# Patient Record
Sex: FEMALE | Race: WHITE | URBAN - METROPOLITAN AREA
[De-identification: names, ages, dates, MRNs, and addresses within clinical notes are randomized per-mention and may not be internally consistent; named-entity substitution may affect disease eponyms.]

---

## 2017-04-07 ENCOUNTER — THERAPY VISIT (OUTPATIENT)
Dept: PHYSICAL THERAPY | Facility: CLINIC | Age: 82
End: 2017-04-07
Payer: COMMERCIAL

## 2017-04-07 DIAGNOSIS — G89.29 CHRONIC PAIN OF RIGHT KNEE: Primary | ICD-10-CM

## 2017-04-07 DIAGNOSIS — M25.561 CHRONIC PAIN OF RIGHT KNEE: Primary | ICD-10-CM

## 2017-04-07 PROCEDURE — 97161 PT EVAL LOW COMPLEX 20 MIN: CPT | Mod: GP | Performed by: PHYSICAL THERAPIST

## 2017-04-07 PROCEDURE — 97110 THERAPEUTIC EXERCISES: CPT | Mod: GP | Performed by: PHYSICAL THERAPIST

## 2017-04-07 ASSESSMENT — ACTIVITIES OF DAILY LIVING (ADL)
WALK: ACTIVITY IS SOMEWHAT DIFFICULT
HOW_WOULD_YOU_RATE_THE_CURRENT_FUNCTION_OF_YOUR_KNEE_DURING_YOUR_USUAL_DAILY_ACTIVITIES_ON_A_SCALE_FROM_0_TO_100_WITH_100_BEING_YOUR_LEVEL_OF_KNEE_FUNCTION_PRIOR_TO_YOUR_INJURY_AND_0_BEING_THE_INABILITY_TO_PERFORM_ANY_OF_YOUR_USUAL_DAILY_ACTIVITIES?: 50
AS_A_RESULT_OF_YOUR_KNEE_INJURY,_HOW_WOULD_YOU_RATE_YOUR_CURRENT_LEVEL_OF_DAILY_ACTIVITY?: NEARLY NORMAL
KNEE_ACTIVITY_OF_DAILY_LIVING_SCORE: 65.71
GO UP STAIRS: ACTIVITY IS SOMEWHAT DIFFICULT
RISE FROM A CHAIR: ACTIVITY IS VERY DIFFICULT
GO DOWN STAIRS: ACTIVITY IS MINIMALLY DIFFICULT
SWELLING: I DO NOT HAVE THE SYMPTOM
WEAKNESS: I HAVE THE SYMPTOM BUT IT DOES NOT AFFECT MY ACTIVITY
SQUAT: ACTIVITY IS VERY DIFFICULT
HOW_WOULD_YOU_RATE_THE_OVERALL_FUNCTION_OF_YOUR_KNEE_DURING_YOUR_USUAL_DAILY_ACTIVITIES?: ABNORMAL
LIMPING: I HAVE THE SYMPTOM BUT IT DOES NOT AFFECT MY ACTIVITY
STIFFNESS: I HAVE THE SYMPTOM BUT IT DOES NOT AFFECT MY ACTIVITY
PAIN: I HAVE THE SYMPTOM BUT IT DOES NOT AFFECT MY ACTIVITY
RAW_SCORE: 46
KNEE_ACTIVITY_OF_DAILY_LIVING_SUM: 46
KNEEL ON THE FRONT OF YOUR KNEE: ACTIVITY IS FAIRLY DIFFICULT
STAND: ACTIVITY IS NOT DIFFICULT
GIVING WAY, BUCKLING OR SHIFTING OF KNEE: I DO NOT HAVE THE SYMPTOM
SIT WITH YOUR KNEE BENT: ACTIVITY IS VERY DIFFICULT

## 2017-04-07 NOTE — MR AVS SNAPSHOT
"              After Visit Summary   4/7/2017    Bella Manley    MRN: [id number]           Patient Information     Date Of Birth          11/11/1925        Visit Information        Provider Department      4/7/2017 1:20 PM Kristen Oconnor, ANA [facility] Physical Therapy        Today's Diagnoses     Chronic pain of right knee    -  1       Follow-ups after your visit        Your next 10 appointments already scheduled     Apr 21, 2017  9:30 AM CDT   CESAR Extremity with Kristen Oconnor PT   [facility] Therapy ([facility])    [address]  [facility] [id number] [phone]              Who to contact     If you have questions or need follow up information about today's clinic visit or your schedule please contact [facility] PHYSICAL [facility] directly at [phone].  Normal or non-critical lab and imaging results will be communicated to you by MyChart, letter or phone within 4 business days after the clinic has received the results. If you do not hear from us within 7 days, please contact the clinic through [employer] or phone. If you have a critical or abnormal lab result, we will notify you by phone as soon as possible.  Submit refill requests through [employer] or call your pharmacy and they will forward the refill request to us. Please allow 3 business days for your refill to be completed.          Additional Information About Your Visit        MyChart Information     [employer] lets you send messages to your doctor, view your test results, renew your prescriptions, schedule appointments and more. To sign up, go to [email or web address] . Click on \"Log in\" on the left side of the screen, which will take you to the Welcome page. Then click on \"Sign up Now\" on the right side of the page.     You will be asked to enter the access code listed below, as well as some personal information. " Please follow the directions to create your username and password.     Your access code is: [email or web address]  Expires: 2017  4:32 PM     Your access code will  in 90 days. If you need help or a new code, please call your [place] clinic or [phone].        Care EveryWhere ID     This is your Care EveryWhere ID. This could be used by other organizations to access your [place] medical records  [id number]         Blood Pressure from Last 3 Encounters:   No data found for BP    Weight from Last 3 Encounters:   No data found for Wt              We Performed the Following     CESAR Inital Eval Report     PT Eval, Low Complexity (54256)     Therapeutic Exercises        Primary Care Provider    None Specified       No primary provider on file.        Thank you!     Thank you for choosing [facility] PHYSICAL THERAPY  for your care. Our goal is always to provide you with excellent care. Hearing back from our patients is one way we can continue to improve our services. Please take a few minutes to complete the written survey that you may receive in the mail after your visit with us. Thank you!             Your Updated Medication List - Protect others around you: Learn how to safely use, store and throw away your medicines at www.disposemymeds.org.      Notice  As of 2017  4:32 PM    You have not been prescribed any medications.

## 2017-04-07 NOTE — PROGRESS NOTES
[facility] Initial Evaluation      Subjective:    Bella Manley is a 89 year old female with a right knee condition.  Condition occurred with:  Insidious onset and degenerative joint disease.  Condition occurred: for unknown reasons.  This is a chronic condition  Reports 5 year onset of R knee pain. She had her annual physical on 4/5/17 and MD advised PT along with an injections. She reports minimal change in the knee today since the injection. She had a series of injections in the knee 3 years ago with no improvement. She has used a cane for 5 months for balance outside of her home. She c/o the most pain in the knee with transition sit to stand.    Patient reports pain:  Anterior and posterior.    Pain is described as shooting and is intermittent and reported as 7/10.   Pain is the same all the time.  Symptoms are exacerbated by ascending stairs, descending stairs, bending/squatting, kneeling, walking, transfers and other (sit to stand) and relieved by other (injection).  Since onset symptoms are gradually worsening.  Special tests:  X-ray.  Previous treatment includes physical therapy.  There was mild improvement following previous treatment.  General health as reported by patient is good.  Pertinent medical history includes:  Rheumatoid arthritis, osteoporosis, osteoarthritis, history of fractures, high blood pressure, heart problems, kidney disease and sleep disorder/apnea.    Other surgeries include:  Orthopedic surgery, other and heart surgery (B wrist carpal tunnel).  Current medications:  Cardiac medication, heparin/coumadin, meds to increase bone density and high blood pressure medication.  Current occupation is retired.            Red flags:  Pain at rest/night.                        Objective:      Gait:    Weight Bearing Status:  WBAT   Assistive Devices:  Cane  Deviations:  Knee:  Knee extension decr RAnkle:  Push off decr R and heel strike decr RGeneral Deviations:  Kaye  "decr and stance time decr    Flexibility/Screens:       Lower Extremity:  Decreased left lower extremity flexibility:Hamstrings and Gastroc    Decreased right lower extremity flexibility:  Hamstrings and Gastroc                                                 Hip Evaluation    Hip Strength:        Abduction:  Left: 4/5    -   Pain:Right: 4/5   -   Pain:                           Knee Evaluation:  ROM:    AROM    Hyperextension:  Left:  0    Right: 0  Extension:  Left: 0    Right:  0  Flexion: Left: 128    Right: 118  PROM    Hyperextension: Left: 0    Right:  0  Extension: Left: 0    Right:  2  Flexion: Left: 130    Right:  124      Strength:     Extension:  Left: 5/5    Pain:-      Right: 5/5    Pain:-  Flexion:  Left: 5/5    Pain:-      Right: 5/5    Pain:-            Palpation:      Right knee tenderness present at:  Patellar Medial    Mobility Testing:  Normal            Functional Testing:            Proprioception:   Stork Balance Test:  Left:  <2\" EO min. assist UE  Right:  <2\" EO min. assist UE  % of Uninvolved:           General     ROS    Assessment/Plan:      Patient is a 89 year old female with right side knee complaints.    Patient has the following significant findings with corresponding treatment plan.                Diagnosis 1:  Knee OA  Pain -  hot/cold therapy, self management, education and home program  Decreased ROM/flexibility - therapeutic exercise and home program  Decreased joint mobility - therapeutic exercise and home program  Decreased strength - therapeutic exercise, therapeutic activities and home program  Impaired balance - neuro re-education, therapeutic activities and home program  Decreased proprioception - neuro re-education, therapeutic activities and home program  Impaired gait - gait training, assistive devices and home program  Impaired muscle performance - neuro re-education and home program  Decreased function - therapeutic activities and home program    Therapy Evaluation " Codes:   1) History comprised of:   Personal factors that impact the plan of care:      Age.    Comorbidity factors that impact the plan of care are:      None.     Medications impacting care: None.  2) Examination of Body Systems comprised of:   Body structures and functions that impact the plan of care:      Knee.   Activity limitations that impact the plan of care are:      Squatting/kneeling, Stairs, Standing, Walking and transition sit to stand.  3) Clinical presentation characteristics are:   Stable/Uncomplicated.  4) Decision-Making    Low complexity using standardized patient assessment instrument and/or measureable assessment of functional outcome.  Cumulative Therapy Evaluation is: Low complexity.    Previous and current functional limitations:  (See Goal Flow Sheet for this information)    Short term and Long term goals: (See Goal Flow Sheet for this information)     Communication ability:  Patient appears to be able to clearly communicate and understand verbal and written communication and follow directions correctly.  Treatment Explanation - The following has been discussed with the patient:   RX ordered/plan of care  Anticipated outcomes  Possible risks and side effects  This patient would benefit from PT intervention to resume normal activities.   Rehab potential is good.    Frequency:  1 X week, once daily  Duration:  for 6 weeks  Discharge Plan:  Achieve all LTG.  Independent in home treatment program.  Reach maximal therapeutic benefit.    Please refer to the daily flowsheet for treatment today, total treatment time and time spent performing 1:1 timed codes.

## 2017-04-21 ENCOUNTER — THERAPY VISIT (OUTPATIENT)
Dept: PHYSICAL THERAPY | Facility: CLINIC | Age: 82
End: 2017-04-21
Payer: COMMERCIAL

## 2017-04-21 DIAGNOSIS — M25.561 CHRONIC PAIN OF RIGHT KNEE: ICD-10-CM

## 2017-04-21 DIAGNOSIS — G89.29 CHRONIC PAIN OF RIGHT KNEE: ICD-10-CM

## 2017-04-21 PROCEDURE — 97110 THERAPEUTIC EXERCISES: CPT | Mod: GP | Performed by: PHYSICAL THERAPIST

## 2017-04-21 ASSESSMENT — ACTIVITIES OF DAILY LIVING (ADL)
SIT WITH YOUR KNEE BENT: ACTIVITY IS SOMEWHAT DIFFICULT
PAIN: THE SYMPTOM AFFECTS MY ACTIVITY MODERATELY
RAW_SCORE: 33
HOW_WOULD_YOU_RATE_THE_CURRENT_FUNCTION_OF_YOUR_KNEE_DURING_YOUR_USUAL_DAILY_ACTIVITIES_ON_A_SCALE_FROM_0_TO_100_WITH_100_BEING_YOUR_LEVEL_OF_KNEE_FUNCTION_PRIOR_TO_YOUR_INJURY_AND_0_BEING_THE_INABILITY_TO_PERFORM_ANY_OF_YOUR_USUAL_DAILY_ACTIVITIES?: 50
KNEE_ACTIVITY_OF_DAILY_LIVING_SUM: 33
WALK: ACTIVITY IS SOMEWHAT DIFFICULT
HOW_WOULD_YOU_RATE_THE_OVERALL_FUNCTION_OF_YOUR_KNEE_DURING_YOUR_USUAL_DAILY_ACTIVITIES?: ABNORMAL
KNEE_ACTIVITY_OF_DAILY_LIVING_SCORE: 47.14
STAND: ACTIVITY IS SOMEWHAT DIFFICULT
GIVING WAY, BUCKLING OR SHIFTING OF KNEE: THE SYMPTOM AFFECTS MY ACTIVITY MODERATELY
SWELLING: THE SYMPTOM AFFECTS MY ACTIVITY SLIGHTLY
WEAKNESS: THE SYMPTOM AFFECTS MY ACTIVITY MODERATELY
STIFFNESS: THE SYMPTOM AFFECTS MY ACTIVITY MODERATELY
GO UP STAIRS: ACTIVITY IS SOMEWHAT DIFFICULT
RISE FROM A CHAIR: ACTIVITY IS FAIRLY DIFFICULT
KNEEL ON THE FRONT OF YOUR KNEE: ACTIVITY IS VERY DIFFICULT
SQUAT: ACTIVITY IS VERY DIFFICULT
LIMPING: THE SYMPTOM AFFECTS MY ACTIVITY SLIGHTLY
AS_A_RESULT_OF_YOUR_KNEE_INJURY,_HOW_WOULD_YOU_RATE_YOUR_CURRENT_LEVEL_OF_DAILY_ACTIVITY?: ABNORMAL
GO DOWN STAIRS: ACTIVITY IS SOMEWHAT DIFFICULT

## 2017-04-21 NOTE — MR AVS SNAPSHOT
"              After Visit Summary   2017    Bella Manley    MRN: [id number]           Patient Information     Date Of Birth          1925        Visit Information        Provider Department      2017 9:30 AM Kristen Oconnor PT [facility] Physical Therapy        Today's Diagnoses     Chronic pain of right knee           Follow-ups after your visit        Who to contact     If you have questions or need follow up information about today's clinic visit or your schedule please contact [facility] ATHLETIC [facility]ICAL [facility] directly at [phone].  Normal or non-critical lab and imaging results will be communicated to you by "[employer]hart, letter or phone within 4 business days after the clinic has received the results. If you do not hear from us within 7 days, please contact the clinic through [employer] or phone. If you have a critical or abnormal lab result, we will notify you by phone as soon as possible.  Submit refill requests through [employer] or call your pharmacy and they will forward the refill request to us. Please allow 3 business days for your refill to be completed.          Additional Information About Your Visit        MyChart Information     [employer] lets you send messages to your doctor, view your test results, renew your prescriptions, schedule appointments and more. To sign up, go to [email or web address] . Click on \"Log in\" on the left side of the screen, which will take you to the Welcome page. Then click on \"Sign up Now\" on the right side of the page.     You will be asked to enter the access code listed below, as well as some personal information. Please follow the directions to create your username and password.     Your access code is: [email or web address]  Expires: 2017  4:32 PM     Your access code will  in 90 days. If you need help or a new code, please call your [place] clinic or [phone].        Care EveryWhere ID  "    This is your Care EveryWhere ID. This could be used by other organizations to access your [place] medical records  [id number]         Blood Pressure from Last 3 Encounters:   No data found for BP    Weight from Last 3 Encounters:   No data found for Wt              We Performed the Following     Therapeutic Exercises        Primary Care Provider    None Specified       No primary provider on file.        Thank you!     Thank you for choosing [place] [facility] PHYSICAL [facility]  for your care. Our goal is always to provide you with excellent care. Hearing back from our patients is one way we can continue to improve our services. Please take a few minutes to complete the written survey that you may receive in the mail after your visit with us. Thank you!             Your Updated Medication List - Protect others around you: Learn how to safely use, store and throw away your medicines at www.disposemymeds.org.      Notice  As of 4/21/2017 10:12 AM    You have not been prescribed any medications.

## 2017-04-21 NOTE — PROGRESS NOTES
Subjective:    HPI                    Objective:    System    Physical Exam    General     ROS    Assessment/Plan:      SUBJECTIVE  Subjective changes as noted by pt:  I think the injection in the knee has helped the pain. HEP is going ok.    Current pain level: 4/10     Changes in function:  Yes (See Goal flowsheet attached for changes in current functional level)     Adverse reaction to treatment or activity:  None    OBJECTIVE  Changes in objective findings:  Yes, knee PROM: 0-0- 126    Improved gait with cane.Progressed to knee bends and SLR ABD - tolerated well.    ASSESSMENT  [place] continues to require intervention to meet STG and LTG's: PT  Patient is progressing as expected.  Patient is becoming more independent in home exercise program  Response to therapy has shown an improvement in  pain level, ROM , gait and function  Progress made towards STG/LTG?  Yes (See Goal flowsheet attached for updates on achievement of STG and LTG)    PLAN  Current treatment program is being advanced to more complex exercises.  The following procedures have been added:  stretching and strengthening  Patient will have carpal tunnel surgery on 5/10/17 so continue HEP at this time.  PTA/ATC plan:  N/A    Please refer to the daily flowsheet for treatment today, total treatment time and time spent performing 1:1 timed codes.            DISCHARGE REPORT    Progress reporting period is from 4/7/17 to 4/21/17.     SUBJECTIVE      Current Pain level: 4/10            ;   ,     Patient has failed to return to therapy so current objective findings are unknown.  The subjective and objective information are from the last SOAP note on this patient.    OBJECTIVE         ASSESSMENT/PLAN  Updated problem list and treatment plan: Diagnosis 1:  R knee pain/ OA    STG/LTGs have been met or progress has been made towards goals:  Yes (See Goal flow sheet completed today.)  Assessment of Progress: The patient has not returned to therapy. Current  status is unknown.  Self Management Plans:  Patient has been instructed in a home treatment program.  Patient  has been instructed in self management of symptoms.    [place] continues to require the following intervention to meet STG and LTG's: PT  The patient failed to complete scheduled/ordered appointments so current information is unknown.  We will discharge this patient from PT.    Recommendations:  This patient is ready to be discharged from therapy and continue their home treatment program.    Please refer to the daily flowsheet for treatment today, total treatment time and time spent performing 1:1 timed codes.

## 2017-05-22 PROBLEM — M25.561 CHRONIC PAIN OF RIGHT KNEE: Status: RESOLVED | Noted: 2017-04-07 | Resolved: 2017-05-22

## 2017-05-22 PROBLEM — G89.29 CHRONIC PAIN OF RIGHT KNEE: Status: RESOLVED | Noted: 2017-04-07 | Resolved: 2017-05-22

## 2017-06-15 ENCOUNTER — THERAPY VISIT (OUTPATIENT)
Dept: PHYSICAL THERAPY | Facility: CLINIC | Age: 82
End: 2017-06-15
Payer: COMMERCIAL

## 2017-06-15 DIAGNOSIS — M62.81 GENERALIZED MUSCLE WEAKNESS: Primary | ICD-10-CM

## 2017-06-15 PROCEDURE — 97110 THERAPEUTIC EXERCISES: CPT | Mod: GP | Performed by: PHYSICAL THERAPIST

## 2017-06-15 PROCEDURE — 97162 PT EVAL MOD COMPLEX 30 MIN: CPT | Mod: GP | Performed by: PHYSICAL THERAPIST

## 2017-06-15 ASSESSMENT — ACTIVITIES OF DAILY LIVING (ADL)
HOW_WOULD_YOU_RATE_THE_OVERALL_FUNCTION_OF_YOUR_KNEE_DURING_YOUR_USUAL_DAILY_ACTIVITIES?: ABNORMAL
HOW_WOULD_YOU_RATE_THE_CURRENT_FUNCTION_OF_YOUR_KNEE_DURING_YOUR_USUAL_DAILY_ACTIVITIES_ON_A_SCALE_FROM_0_TO_100_WITH_100_BEING_YOUR_LEVEL_OF_KNEE_FUNCTION_PRIOR_TO_YOUR_INJURY_AND_0_BEING_THE_INABILITY_TO_PERFORM_ANY_OF_YOUR_USUAL_DAILY_ACTIVITIES?: 50
GO UP STAIRS: ACTIVITY IS VERY DIFFICULT
RAW_SCORE: 31
SQUAT: ACTIVITY IS FAIRLY DIFFICULT
WALK: ACTIVITY IS FAIRLY DIFFICULT
SWELLING: I DO NOT HAVE THE SYMPTOM
SIT WITH YOUR KNEE BENT: ACTIVITY IS FAIRLY DIFFICULT
PAIN: THE SYMPTOM AFFECTS MY ACTIVITY MODERATELY
LIMPING: THE SYMPTOM AFFECTS MY ACTIVITY MODERATELY
RISE FROM A CHAIR: ACTIVITY IS VERY DIFFICULT
WEAKNESS: THE SYMPTOM AFFECTS MY ACTIVITY MODERATELY
GO DOWN STAIRS: ACTIVITY IS VERY DIFFICULT
GIVING WAY, BUCKLING OR SHIFTING OF KNEE: THE SYMPTOM AFFECTS MY ACTIVITY MODERATELY
STIFFNESS: THE SYMPTOM AFFECTS MY ACTIVITY SLIGHTLY
KNEE_ACTIVITY_OF_DAILY_LIVING_SUM: 31
KNEE_ACTIVITY_OF_DAILY_LIVING_SCORE: 44.29
KNEEL ON THE FRONT OF YOUR KNEE: ACTIVITY IS SOMEWHAT DIFFICULT
STAND: ACTIVITY IS SOMEWHAT DIFFICULT
AS_A_RESULT_OF_YOUR_KNEE_INJURY,_HOW_WOULD_YOU_RATE_YOUR_CURRENT_LEVEL_OF_DAILY_ACTIVITY?: ABNORMAL

## 2017-06-15 NOTE — LETTER
[facility] A[facility]IC [facility] PHYSICAL THERAPY  [address] [facility] [address]  [facility] [id number] [phone]    2017    Re: Bella Manley   :   1925  MRN:  [id number]   REFERRING PHYSICIAN:   Hank Arvizu    [facility] PHYSICAL [facility]  Date of Initial Evaluation:  6/15/17  Visits:  Rxs Used: 1  Reason for Referral:  Generalized muscle weakness    EVALUATION SUMMARY    [facility] Initial [facility]  Subjective:  Patient is a 89 year old female presenting with rehab right knee hpi. The history is provided by the patient. No  was used.   Affected Side: general weakness/history of falls.  Condition occurred with:  Other reason.  Condition occurred: other.  This is a new condition  Patient reports that she has had a history of falls with the first time in  when she slipped on ice stepping onto a curb. Her next fall was about 5 years ago. She has had about 5 total over the last several years. The most recent was on 17 when putting out some garbage out and she thinks she stumbled on her cane. She dislocated her right little finger with lacerations on the right hand with that fall. She states that her doctor thought it would be a good idea to have therapy to work on her strength. She states that she does lose her balance quite frequently-weekly. She does use a cane when she is out doors. She claims a history of R knee OA and R hip bursitis. MD order from 17..    Patient reports pain:  Anterior, medial, lateral and posterior (intermittent R knee, more anterior and lateral).    Pain is described as aching and is intermittent and reported as 5/10.  Associated symptoms:  Loss of strength. Pain is the same all the time.  Symptoms are exacerbated by walking, bending/squatting, ascending stairs, descending stairs and standing (walk lesa 10' with onset of fatigue and SOB, needs to use the arm of  chair to get up from sitting) and relieved by nothing.  Since onset symptoms are unchanged.        General health as reported by patient is fair.  Pertinent medical history includes:  History of fractures, rheumatoid arthritis and osteoarthritis.  Medical allergies: yes (latex).  Other surgeries include:  None reported.  Current medications:  High blood pressure medication and cardiac medication.  Current occupation is retired.        Barriers include:  None as reported by the patient.    Red flags:  None as reported by the patient.    Objective:  Gait:  Slow guarded gait with wider base of support  Gait Type:  Antalgic   Assistive Devices:  Cane  Deviations:  General Deviations:  Base of support incr and duong decr    Ankle/Foot Evaluation  ROM:    Strength:    Plantarflexion: Left: 4+/5   Right: 4/5      Hip Evaluation  Hip Strength:    Extension:  Left: 4/5  Right: 4-/5      Abduction:  Left: 4-/5     Right: 4-/5            Re: Bella Manley   :   1925       Knee Evaluation:  ROM:  Arom wnl knee: Knee AROM WFL B.    Strength:   Extension:  Left: 4+/5     Right: 4+/5    Flexion:  Left: 5/5       Right: 5-/5     General Evaluation:  Functional Assessment:  Functional assessment wnl: sit to stand using arms of chair in 30 seconds: 5 reps.    Assessment/Plan:    Patient is a 89 year old female with muscle weakness/history of falls complaints.    Patient has the following significant findings with corresponding treatment plan.                Diagnosis 1:  weakness  Pain -  self management, education, directional preference exercise and home program  Decreased strength - therapeutic exercise, therapeutic activities and home program  Impaired balance - neuro re-education, gait training, therapeutic activities and home program  Decreased proprioception - neuro re-education, therapeutic activities and home program  Impaired muscle performance - neuro re-education and home program  Decreased function -  therapeutic activities and home program  Impaired posture - neuro re-education and home program    Therapy Evaluation Codes:   1) History comprised of:   Personal factors that impact the plan of care:      Time since onset of symptoms.    Comorbidity factors that impact the plan of care are:      Implanted device, Weakness and None.     Medications impacting care: None.  2) Examination of Body Systems comprised of:   Body structures and functions that impact the plan of care:      Hip, Knee and genreal weakness.   Activity limitations that impact the plan of care are:      Squatting/kneeling, Stairs, Standing and Walking.  3) Clinical presentation characteristics are:   Evolving/Changing.  4) Decision-Making    Moderate complexity using standardized patient assessment instrument and/or measureable assessment of functional outcome.  Cumulative Therapy Evaluation is: Moderate complexity.    Previous and current functional limitations:  (See Goal Flow Sheet for this information)    Short term and Long term goals: (See Goal Flow Sheet for this information)     Communication ability:  Patient appears to be able to clearly communicate and understand verbal and written communication and follow directions correctly.  Treatment Explanation - The following has been discussed with the patient:     RX ordered/plan of care  Anticipated outcomes  Possible risks and side effects  This patient would benefit from PT intervention to resume normal activities.   Rehab potential is good.    Frequency:  1 X week, once daily  Duration:  for 6 weeks  Discharge Plan:  Achieve all LTG.  Independent in home treatment program.  Reach maximal therapeutic benefit.    Re: Bella Manley   :   1925    Thank you for your referral.    INQUIRIES  Therapist: Tasha Ballesteros, PT  [facility] PHYSICAL THERAPY  [address] [place] [address]. [address]  [facility] [address]  Phone: [phone]  Fax: [phone]

## 2017-06-15 NOTE — MR AVS SNAPSHOT
After Visit Summary   6/15/2017    Bella Manley    MRN: [id number]           Patient Information     Date Of Birth          11/11/1925        Visit Information        Provider Department      6/15/2017 11:10 AM Tasha Ballesteros, PT [facility]        Today's Diagnoses     Generalized muscle weakness    -  1       Follow-ups after your visit        Your next 10 appointments already scheduled     Jun 22, 2017  2:20 PM CDT   CESAR Extremity with Karsten Grey PT   [facility] ([facility])    [address]  [facility] [id number] [phone]            Jun 29, 2017  3:40 PM CDT   CESAR Extremity with Tasha Ballesteros, PT   [facility] ([facility])    [address]  [facility] [address] [phone]              Who to contact     If you have questions or need follow up information about today's clinic visit or your schedule please contact [facility] directly at [phone].  Normal or non-critical lab and imaging results will be communicated to you by MyChart, letter or phone within 4 business days after the clinic has received the results. If you do not hear from us within 7 days, please contact the clinic through Yostrohart or phone. If you have a critical or abnormal lab result, we will notify you by phone as soon as possible.  Submit refill requests through [employer] or call your pharmacy and they will forward the refill request to us. Please allow 3 business days for your refill to be completed.          Additional Information About Your Visit        MyChart Information     [employer] lets you send messages to your doctor, view your test results, renew your prescriptions, schedule appointments and more. To sign up, go to [email or web address] . Click  "on \"Log in\" on the left side of the screen, which will take you to the Welcome page. Then click on \"Sign up Now\" on the right side of the page.     You will be asked to enter the access code listed below, as well as some personal information. Please follow the directions to create your username and password.     Your access code is: [email or web address]  Expires: 2017  4:32 PM     Your access code will  in 90 days. If you need help or a new code, please call your [facility] clinic or [phone].        Care EveryWhere ID     This is your Care EveryWhere ID. This could be used by other organizations to access your [facility] medical records  [id number]         Blood Pressure from Last 3 Encounters:   No data found for BP    Weight from Last 3 Encounters:   No data found for Wt              We Performed the Following     CESAR Inital Eval Report     PT Eval, Moderate Complexity (97174)     Therapeutic Exercises        Primary Care Provider    None Specified       No primary provider on file.        Equal Access to Services     [facility]: Hadii aad ku hadasho Soomaali, waaxda luqadaha, qaybta kaalmada adeegyada, waxay elicia agustin . So [facility] [phone].    ATENCIÓN: Si habla español, tiene a zimmerman disposición servicios gratuitos de asistencia lingüística. Llame al [phone].    We comply with applicable federal civil rights laws and [place] laws. We do not discriminate on the basis of race, color, national origin, age, disability sex, sexual orientation or gender identity.            Thank you!     Thank you for choosing [facility] PH[facility] THERAP[facility]  for your care. Our goal is always to provide you with excellent care. Hearing back from our patients is one way we can continue to improve our services. Please take a few minutes to complete the written survey that you may receive in the mail after your visit with us. Thank you!             Your Updated " Medication List - Protect others around you: Learn how to safely use, store and throw away your medicines at www.disposemymeds.org.      Notice  As of 6/15/2017 11:59 PM    You have not been prescribed any medications.

## 2017-06-15 NOTE — PROGRESS NOTES
[facility] Initial Evaluation    Subjective:    Patient is a 89 year old female presenting with rehab right knee hpi. The history is provided by the patient. No  was used.   Affected Side: general weakness/history of falls.  Condition occurred with:  Other reason.  Condition occurred: other.  This is a new condition  Patient reports that she has had a history of falls with the first time in 2009 when she slipped on ice stepping onto a curb. Her next fall was about 5 years ago. She has had about 5 total over the last several years. The most recent was on 5-30-17 when putting out some garbage out and she thinks she stumbled on her cane. She dislocated her right little finger with lacerations on the right hand with that fall. She states that her doctor thought it would be a good idea to have therapy to work on her strength. She states that she does lose her balance quite frequently-weekly. She does use a cane when she is out doors. She claims a history of R knee OA and R hip bursitis. MD order from 6-12-17..    Patient reports pain:  Anterior, medial, lateral and posterior (intermittent R knee, more anterior and lateral).    Pain is described as aching and is intermittent and reported as 5/10.  Associated symptoms:  Loss of strength. Pain is the same all the time.  Symptoms are exacerbated by walking, bending/squatting, ascending stairs, descending stairs and standing (walk elsa 10' with onset of fatigue and SOB, needs to use the arm of chair to get up from sitting) and relieved by nothing.  Since onset symptoms are unchanged.        General health as reported by patient is fair.  Pertinent medical history includes:  History of fractures, rheumatoid arthritis and osteoarthritis.  Medical allergies: yes (latex).  Other surgeries include:  None reported.  Current medications:  High blood pressure medication and cardiac medication.  Current occupation is retired.        Barriers  include:  None as reported by the patient.    Red flags:  None as reported by the patient.                        Objective:      Gait:  Slow guarded gait with wider base of support  Gait Type:  Antalgic   Assistive Devices:  Cane  Deviations:  General Deviations:  Base of support incr and duong decr          Ankle/Foot Evaluation  ROM:        Strength:      Plantarflexion: Left: 4+/5   Pain:   Right: 4/5  Pain:                                                                       Hip Evaluation    Hip Strength:      Extension:  Left: 4/5  Pain:Right: 4-/5    Pain:    Abduction:  Left: 4-/5     Pain:Right: 4-/5    Pain:                           Knee Evaluation:  ROM:  Arom wnl knee: Knee AROM WFL B.            Strength:     Extension:  Left: 4+/5   Pain:      Right: 4+/5   Pain:  Flexion:  Left: 5/5   Pain:      Right: 5-/5   Pain:                            General Evaluation:                          Functional Assessment:  Functional assessment wnl: sit to stand using arms of chair in 30 seconds: 5 reps.                                               ROS    Assessment/Plan:      Patient is a 89 year old female with muscle weakness/history of falls complaints.    Patient has the following significant findings with corresponding treatment plan.                Diagnosis 1:  weakness  Pain -  self management, education, directional preference exercise and home program  Decreased strength - therapeutic exercise, therapeutic activities and home program  Impaired balance - neuro re-education, gait training, therapeutic activities and home program  Decreased proprioception - neuro re-education, therapeutic activities and home program  Impaired muscle performance - neuro re-education and home program  Decreased function - therapeutic activities and home program  Impaired posture - neuro re-education and home program    Therapy Evaluation Codes:   1) History comprised of:   Personal factors that impact the plan of care:       Time since onset of symptoms.    Comorbidity factors that impact the plan of care are:      Implanted device, Weakness and None.     Medications impacting care: None.  2) Examination of Body Systems comprised of:   Body structures and functions that impact the plan of care:      Hip, Knee and genreal weakness.   Activity limitations that impact the plan of care are:      Squatting/kneeling, Stairs, Standing and Walking.  3) Clinical presentation characteristics are:   Evolving/Changing.  4) Decision-Making    Moderate complexity using standardized patient assessment instrument and/or measureable assessment of functional outcome.  Cumulative Therapy Evaluation is: Moderate complexity.    Previous and current functional limitations:  (See Goal Flow Sheet for this information)    Short term and Long term goals: (See Goal Flow Sheet for this information)     Communication ability:  Patient appears to be able to clearly communicate and understand verbal and written communication and follow directions correctly.  Treatment Explanation - The following has been discussed with the patient:   RX ordered/plan of care  Anticipated outcomes  Possible risks and side effects  This patient would benefit from PT intervention to resume normal activities.   Rehab potential is good.    Frequency:  1 X week, once daily  Duration:  for 6 weeks  Discharge Plan:  Achieve all LTG.  Independent in home treatment program.  Reach maximal therapeutic benefit.    Please refer to the daily flowsheet for treatment today, total treatment time and time spent performing 1:1 timed codes.

## 2017-06-22 ENCOUNTER — THERAPY VISIT (OUTPATIENT)
Dept: PHYSICAL THERAPY | Facility: CLINIC | Age: 82
End: 2017-06-22
Payer: COMMERCIAL

## 2017-06-22 DIAGNOSIS — M62.81 GENERALIZED MUSCLE WEAKNESS: ICD-10-CM

## 2017-06-22 PROCEDURE — 97116 GAIT TRAINING THERAPY: CPT | Mod: GP | Performed by: PHYSICAL THERAPIST

## 2017-06-22 PROCEDURE — 97110 THERAPEUTIC EXERCISES: CPT | Mod: GP | Performed by: PHYSICAL THERAPIST

## 2017-06-22 NOTE — PROGRESS NOTES
"Subjective:    HPI                    Objective:    System    Physical Exam    General     ROS    Assessment/Plan:      SUBJECTIVE  Subjective changes as noted by pt:  Doing ok.  Continues to perform the exercises recommended on first day of PT.       Current pain level: 5/10  In knees  Changes in function:  None     Adverse reaction to treatment or activity:  None    OBJECTIVE  Changes in objective findings:  Yes, can height slightly high.  Adjusted and pt reports \"that feels easier.\" Significant extension lag on SLR bilaterally.  Improved with manual/verbal cueing.         ASSESSMENT  [place] continues to require intervention to meet STG and LTG's: PT  Patient is progressing as expected.  Response to therapy has shown lack of progress in  pain level  Progress made towards STG/LTG?  None    PLAN  Continue current treatment plan until patient demonstrates readiness to progress to higher level exercises.    PTA/ATC plan:  N/A    Please refer to the daily flowsheet for treatment today, total treatment time and time spent performing 1:1 timed codes.              "

## 2017-06-22 NOTE — MR AVS SNAPSHOT
"              After Visit Summary   6/22/2017    Bella Manley    MRN: [id number]           Patient Information     Date Of Birth          11/11/1925        Visit Information        Provider Department      6/22/2017 2:20 PM Karsten Grey, PT [facility]        Today's Diagnoses     Generalized muscle weakness           Follow-ups after your visit        Your next 10 appointments already scheduled     Jun 29, 2017  3:40 PM CDT   CESAR Extremity with Tasha Ballesteros, PT   [facility] ([facility])    [address]  [facility] [id number] [phone]              Who to contact     If you have questions or need follow up information about today's clinic visit or your schedule please contact [facility] directly at [phone].  Normal or non-critical lab and imaging results will be communicated to you by MyChart, letter or phone within 4 business days after the clinic has received the results. If you do not hear from us within 7 days, please contact the clinic through TheFriendMailhart or phone. If you have a critical or abnormal lab result, we will notify you by phone as soon as possible.  Submit refill requests through [employer]Game or call your pharmacy and they will forward the refill request to us. Please allow 3 business days for your refill to be completed.          Additional Information About Your Visit        MyChart Information     [employer] lets you send messages to your doctor, view your test results, renew your prescriptions, schedule appointments and more. To sign up, go to [email or web address] . Click on \"Log in\" on the left side of the screen, which will take you to the Welcome page. Then click on \"Sign up Now\" on the right side of the page.     You will be asked to enter the access code listed below, as well as some personal " information. Please follow the directions to create your username and password.     Your access code is: [email or web address]  Expires: 2017  4:32 PM     Your access code will  in 90 days. If you need help or a new code, please call your [facility] clinic or [phone].        Care EveryWhere ID     This is your Care EveryWhere ID. This could be used by other organizations to access your [facility] medical records  [id number]         Blood Pressure from Last 3 Encounters:   No data found for BP    Weight from Last 3 Encounters:   No data found for Wt              We Performed the Following     Gait Training Therapy     THERAPEUTIC EXERCISES        Primary Care Provider    None Specified       No primary provider on file.        Equal Access to Services     [facility] : Lisa Rivers, adeola frances, irving rodriguez, maryuri agustin . So [place] [phone].    ATENCIÓN: Si habla español, tiene a zimmerman disposición servicios gratuitos de asistencia lingüística. Llame al [phone].    We comply with applicable federal civil rights laws and [place] laws. We do not discriminate on the basis of race, color, national origin, age, disability sex, sexual orientation or gender identity.            Thank you!     Thank you for choosing [facility] THERAPY  for your care. Our goal is always to provide you with excellent care. Hearing back from our patients is one way we can continue to improve our services. Please take a few minutes to complete the written survey that you may receive in the mail after your visit with us. Thank you!             Your Updated Medication List - Protect others around you: Learn how to safely use, store and throw away your medicines at www.disposemymeds.org.      Notice  As of 2017  3:04 PM    You have not been prescribed any medications.

## 2017-06-29 ENCOUNTER — THERAPY VISIT (OUTPATIENT)
Dept: PHYSICAL THERAPY | Facility: CLINIC | Age: 82
End: 2017-06-29
Payer: COMMERCIAL

## 2017-06-29 DIAGNOSIS — M62.81 GENERALIZED MUSCLE WEAKNESS: ICD-10-CM

## 2017-06-29 PROCEDURE — 97530 THERAPEUTIC ACTIVITIES: CPT | Mod: GP | Performed by: PHYSICAL THERAPIST

## 2017-06-29 PROCEDURE — 97110 THERAPEUTIC EXERCISES: CPT | Mod: GP | Performed by: PHYSICAL THERAPIST

## 2017-06-29 ASSESSMENT — ACTIVITIES OF DAILY LIVING (ADL)
AS_A_RESULT_OF_YOUR_KNEE_INJURY,_HOW_WOULD_YOU_RATE_YOUR_CURRENT_LEVEL_OF_DAILY_ACTIVITY?: ABNORMAL
KNEE_ACTIVITY_OF_DAILY_LIVING_SCORE: 60
HOW_WOULD_YOU_RATE_THE_CURRENT_FUNCTION_OF_YOUR_KNEE_DURING_YOUR_USUAL_DAILY_ACTIVITIES_ON_A_SCALE_FROM_0_TO_100_WITH_100_BEING_YOUR_LEVEL_OF_KNEE_FUNCTION_PRIOR_TO_YOUR_INJURY_AND_0_BEING_THE_INABILITY_TO_PERFORM_ANY_OF_YOUR_USUAL_DAILY_ACTIVITIES?: 60
STAND: ACTIVITY IS MINIMALLY DIFFICULT
RISE FROM A CHAIR: ACTIVITY IS FAIRLY DIFFICULT
GO DOWN STAIRS: ACTIVITY IS FAIRLY DIFFICULT
WALK: ACTIVITY IS SOMEWHAT DIFFICULT
WEAKNESS: THE SYMPTOM AFFECTS MY ACTIVITY SLIGHTLY
KNEE_ACTIVITY_OF_DAILY_LIVING_SUM: 42
KNEEL ON THE FRONT OF YOUR KNEE: ACTIVITY IS SOMEWHAT DIFFICULT
GIVING WAY, BUCKLING OR SHIFTING OF KNEE: THE SYMPTOM AFFECTS MY ACTIVITY SLIGHTLY
STIFFNESS: THE SYMPTOM AFFECTS MY ACTIVITY SLIGHTLY
HOW_WOULD_YOU_RATE_THE_OVERALL_FUNCTION_OF_YOUR_KNEE_DURING_YOUR_USUAL_DAILY_ACTIVITIES?: ABNORMAL
SQUAT: ACTIVITY IS FAIRLY DIFFICULT
PAIN: THE SYMPTOM AFFECTS MY ACTIVITY SLIGHTLY
SIT WITH YOUR KNEE BENT: ACTIVITY IS MINIMALLY DIFFICULT
GO UP STAIRS: ACTIVITY IS FAIRLY DIFFICULT
SWELLING: I DO NOT HAVE THE SYMPTOM
LIMPING: THE SYMPTOM AFFECTS MY ACTIVITY SLIGHTLY
RAW_SCORE: 42

## 2017-06-29 NOTE — LETTER
[facility] PHYSICAL THERAPY  [address]  [place] [address] [phone]    July 3, 2017    Re: Bella Manley   :   1925  MRN:  [id number]   REFERRING PHYSICIAN:   Hank Arvizu    [facility] PH[facility]    Date of Initial Evaluation:    Visits:  Rxs Used: 3  Reason for Referral:  Generalized muscle weakness      DISCHARGE REPORT    Progress reporting period is from 6-15-17 to 17.       SUBJECTIVE  Subjective changes noted by patient:   Patient reports that she is generally sore and thinks the weather changes are affecting her knees. She feels comfortable with her home program as has done most of these exercises before.. She is glad she came in and corrected the technique of some of the exercises though.      .        Current Pain level: 6/10 (for knee pain, R>>L).     Previous pain level was  6/10  .   Changes in function:  Yes (See Goal flowsheet attached for changes in current functional level)  Adverse reaction to treatment or activity: None    OBJECTIVE  Changes noted in objective findings:  Sit to stand using arms of chair in 30 seconds: 6 reps. NO other major objective changes noted since initial evaluation.       ASSESSMENT/PLAN  Updated problem list and treatment plan: Diagnosis 1:  weakness  Pain -  self management and education  Decreased strength - therapeutic exercise, therapeutic activities and home program  Impaired balance - neuro re-education, therapeutic activities and home program  Decreased proprioception - neuro re-education, therapeutic activities and home program  Impaired muscle performance - neuro re-education and home program  Decreased function - therapeutic activities and home program  STG/LTGs have been met or progress has been made towards goals:  Yes (See Goal flow sheet completed today.)  Assessment of Progress: Slow change in status.  Self Management Plans:  Patient has  been instructed in a home treatment program.  Patient  has been instructed in self management of symptoms.    [place] continues to require the following intervention to meet STG and LTG's:  PT intervention is no longer required to meet STG/LTG.  Patient will call with questions and/or problems.   Recommendations:  This patient is ready to be discharged from therapy and continue their home treatment program.  for your referral.    INQUIRIES  Therapist: Tasha Ballesteros, PT  [facility]  [address] [place] [address]. [address]  [facility] [address]  Phone: [phone]  Fax: [phone]

## 2017-06-29 NOTE — MR AVS SNAPSHOT
"              After Visit Summary   2017    Bella Manley    MRN: [id number]           Patient Information     Date Of Birth          1925        Visit Information        Provider Department      2017 3:40 PM Tasha Ballesteros PT [facility]        Today's Diagnoses     Generalized muscle weakness           Follow-ups after your visit        Who to contact     If you have questions or need follow up information about today's clinic visit or your schedule please contact [facility] PHYSICAL [facility] directly at [phone].  Normal or non-critical lab and imaging results will be communicated to you by Nonaboxhart, letter or phone within 4 business days after the clinic has received the results. If you do not hear from us within 7 days, please contact the clinic through Red Vent[employer]st or phone. If you have a critical or abnormal lab result, we will notify you by phone as soon as possible.  Submit refill requests through [email or web address] or call your pharmacy and they will forward the refill request to us. Please allow 3 business days for your refill to be completed.          Additional Information About Your Visit        MyChart Information     [email or web address] lets you send messages to your doctor, view your test results, renew your prescriptions, schedule appointments and more. To sign up, go to [email or web address] . Click on \"Log in\" on the left side of the screen, which will take you to the Welcome page. Then click on \"Sign up Now\" on the right side of the page.     You will be asked to enter the access code listed below, as well as some personal information. Please follow the directions to create your username and password.     Your access code is: [email or web address]  Expires: 2017  4:32 PM     Your access code will  in 90 days. If you need help or a new code, please call your [facility] clinic or [phone].        Care " EveryWhere ID     This is your Care EveryWhere ID. This could be used by other organizations to access your [facility] medical records  [id number]         Blood Pressure from Last 3 Encounters:   No data found for BP    Weight from Last 3 Encounters:   No data found for Wt              We Performed the Following     CESAR Progress Notes Report     Therapeutic Activities     Therapeutic Exercises        Primary Care Provider    None Specified       No primary provider on file.        Equal Access to Services     [facility]: Hadii aad ku hadasho Soomaali, waaxda luqadaha, qaybta kaalmada adeegyada, maryuri rubi haykymberlyn aderekha de los santosritaabundio agustin . So [facility] [phone].    ATENCIÓN: Si habla español, tiene a zimmerman disposición servicios gratuitos de asistencia lingüística. Llame al [phone].    We comply with applicable federal civil rights laws and [place] laws. We do not discriminate on the basis of race, color, national origin, age, disability sex, sexual orientation or gender identity.            Thank you!     Thank you for choosing [facility] PHYSICAL THERAPY  for your care. Our goal is always to provide you with excellent care. Hearing back from our patients is one way we can continue to improve our services. Please take a few minutes to complete the written survey that you may receive in the mail after your visit with us. Thank you!             Your Updated Medication List - Protect others around you: Learn how to safely use, store and throw away your medicines at www.disposemymeds.org.      Notice  As of 6/29/2017 11:59 PM    You have not been prescribed any medications.

## 2017-06-29 NOTE — PROGRESS NOTES
Subjective:    HPI                    Objective:    System    Physical Exam    General     ROS    Assessment/Plan:      DISCHARGE REPORT    Progress reporting period is from 6-15-17 to 6-29-17.       SUBJECTIVE  Subjective changes noted by patient:   Patient reports that she is generally sore and thinks the weather changes are affecting her knees. She feels comfortable with her home program as has done most of these exercises before.. She is glad she came in and corrected the technique of some of the exercises though.      .        Current Pain level: 6/10 (for knee pain, R>>L).     Previous pain level was  6/10  .   Changes in function:  Yes (See Goal flowsheet attached for changes in current functional level)  Adverse reaction to treatment or activity: None    OBJECTIVE  Changes noted in objective findings:  Sit to stand using arms of chair in 30 seconds: 6 reps. NO other major objective changes noted since initial evaluation.       ASSESSMENT/PLAN  Updated problem list and treatment plan: Diagnosis 1:  weakness  Pain -  self management and education  Decreased strength - therapeutic exercise, therapeutic activities and home program  Impaired balance - neuro re-education, therapeutic activities and home program  Decreased proprioception - neuro re-education, therapeutic activities and home program  Impaired muscle performance - neuro re-education and home program  Decreased function - therapeutic activities and home program  STG/LTGs have been met or progress has been made towards goals:  Yes (See Goal flow sheet completed today.)  Assessment of Progress: Slow change in status.  Self Management Plans:  Patient has been instructed in a home treatment program.  Patient  has been instructed in self management of symptoms.    [place] continues to require the following intervention to meet STG and LTG's:  PT intervention is no longer required to meet STG/LTG.  Patient will call with questions and/or problems.    Recommendations:  This patient is ready to be discharged from therapy and continue their home treatment program.    Please refer to the daily flowsheet for treatment today, total treatment time and time spent performing 1:1 timed codes.

## 2017-06-29 NOTE — PROGRESS NOTES
"Subjective:    HPI                    Objective:    System    Physical Exam    General     ROS    Assessment/Plan:      SUBJECTIVE  Subjective changes as noted by pt:  Patient reports that she is generally sore and thinks the weather changes are affecting her knees.       Current Pain level: 6/10 (for knee pain, R>>L)   Changes in function:  Yes (See Goal flowsheet attached for changes in current functional level)     Adverse reaction to treatment or activity:  None    OBJECTIVE  Changes in objective findings:  ***        ASSESSMENT  [place] continues to require intervention to meet STG and LTG's: {INTERVENTION REHAB:381728}  {ASSESSMENT THERAPY:068370}  {Response to PT:109847}  Progress made towards STG/LTG?  {Changes in STG/LT}    PLAN  {Response to PT:818831}    PTA/ATC plan:  {PLAN PTA:699988::\"N/A\"}    Please refer to the daily flowsheet for treatment today, total treatment time and time spent performing 1:1 timed codes.              "

## 2017-06-30 PROBLEM — M62.81 GENERALIZED MUSCLE WEAKNESS: Status: RESOLVED | Noted: 2017-06-22 | Resolved: 2017-06-30

## 2018-01-25 LAB
ALBUMIN SERPL-MCNC: 3.6 G/DL
ALP SERPL-CCNC: 89 U/L
ALT SERPL-CCNC: 24 U/L
ANION GAP SERPL CALCULATED.3IONS-SCNC: 5 MMOL/L
AST SERPL-CCNC: 23 U/L
BILIRUB SERPL-MCNC: 0.6 MG/DL
BUN SERPL-MCNC: 26 MG/DL
CALCIUM SERPL-MCNC: 9 MG/DL
CHLORIDE SERPLBLD-SCNC: 100 MMOL/L
CO2 SERPL-SCNC: 33 MMOL/L
CREAT SERPL-MCNC: 1.41 MG/DL
GFR SERPL CREATININE-BSD FRML MDRD: 35 ML/MIN/1.73M2
GLUCOSE SERPL-MCNC: 86 MG/DL (ref 70–99)
POTASSIUM SERPL-SCNC: 4.1 MMOL/L
PROT SERPL-MCNC: 7.3 G/DL
SODIUM SERPL-SCNC: 138 MMOL/L
TSH SERPL-ACNC: 4.69 MCU/ML

## 2018-02-12 LAB — MAGNESIUM SERPL-MCNC: 2.5 MG/DL

## 2018-05-09 ENCOUNTER — TRANSFERRED RECORDS (OUTPATIENT)
Dept: HEALTH INFORMATION MANAGEMENT | Facility: CLINIC | Age: 83
End: 2018-05-09

## 2018-05-22 ENCOUNTER — TRANSFERRED RECORDS (OUTPATIENT)
Dept: HEALTH INFORMATION MANAGEMENT | Facility: CLINIC | Age: 83
End: 2018-05-22

## 2018-05-22 ASSESSMENT — ENCOUNTER SYMPTOMS
DECREASED CONCENTRATION: 0
SWOLLEN GLANDS: 0
HALLUCINATIONS: 0
POLYPHAGIA: 0
NUMBNESS: 1
MUSCLE CRAMPS: 0
NECK MASS: 0
SNORES LOUDLY: 0
NECK PAIN: 0
DEPRESSION: 0
SLEEP DISTURBANCES DUE TO BREATHING: 0
BACK PAIN: 1
ARTHRALGIAS: 1
HEARTBURN: 0
TASTE DISTURBANCE: 0
HOARSE VOICE: 0
BLOATING: 0
NIGHT SWEATS: 0
DECREASED APPETITE: 0
INSOMNIA: 1
WHEEZING: 0
TREMORS: 0
DIZZINESS: 1
WEIGHT GAIN: 1
CHILLS: 1
INCREASED ENERGY: 1
BLOOD IN STOOL: 0
TROUBLE SWALLOWING: 0
SEIZURES: 0
FATIGUE: 1
SINUS CONGESTION: 0
SKIN CHANGES: 0
BRUISES/BLEEDS EASILY: 1
LOSS OF CONSCIOUSNESS: 0
MUSCLE WEAKNESS: 1
WEIGHT LOSS: 0
POLYDIPSIA: 0
LEG PAIN: 0
TINGLING: 1
EXERCISE INTOLERANCE: 1
HYPOTENSION: 1
CONSTIPATION: 0
MEMORY LOSS: 0
DISTURBANCES IN COORDINATION: 1
POOR WOUND HEALING: 0
POSTURAL DYSPNEA: 1
NERVOUS/ANXIOUS: 1
SHORTNESS OF BREATH: 1
SYNCOPE: 0
NAIL CHANGES: 0
PALPITATIONS: 1
FEVER: 0
HYPERTENSION: 1
DIARRHEA: 0
MYALGIAS: 0
SORE THROAT: 0
VOMITING: 0
RECTAL PAIN: 0
ABDOMINAL PAIN: 0
JOINT SWELLING: 0
JAUNDICE: 0
LIGHT-HEADEDNESS: 1
SMELL DISTURBANCE: 0
ORTHOPNEA: 0
HEMOPTYSIS: 0
ALTERED TEMPERATURE REGULATION: 1
STIFFNESS: 1
SPUTUM PRODUCTION: 0
SPEECH CHANGE: 0
SINUS PAIN: 0
PANIC: 0
NAUSEA: 0
WEAKNESS: 1
PARALYSIS: 0
DYSPNEA ON EXERTION: 1
BOWEL INCONTINENCE: 0
COUGH: 0
COUGH DISTURBING SLEEP: 0
HEADACHES: 0

## 2018-05-23 ENCOUNTER — TRANSFERRED RECORDS (OUTPATIENT)
Dept: HEALTH INFORMATION MANAGEMENT | Facility: CLINIC | Age: 83
End: 2018-05-23

## 2018-05-29 ENCOUNTER — PRE VISIT (OUTPATIENT)
Dept: CARDIOLOGY | Facility: CLINIC | Age: 83
End: 2018-05-29
Payer: COMMERCIAL

## 2018-05-29 DIAGNOSIS — I50.32 CHRONIC DIASTOLIC HEART FAILURE (H): ICD-10-CM

## 2018-05-29 DIAGNOSIS — N18.30 CKD (CHRONIC KIDNEY DISEASE) STAGE 3, GFR 30-59 ML/MIN (H): ICD-10-CM

## 2018-05-29 DIAGNOSIS — I48.91 ATRIAL FIBRILLATION (H): Primary | ICD-10-CM

## 2018-05-29 DIAGNOSIS — Z79.899 ON AMIODARONE THERAPY: ICD-10-CM

## 2018-05-30 ENCOUNTER — OFFICE VISIT (OUTPATIENT)
Dept: CARDIOLOGY | Facility: CLINIC | Age: 83
End: 2018-05-30
Attending: INTERNAL MEDICINE
Payer: COMMERCIAL

## 2018-05-30 ENCOUNTER — HOSPITAL ENCOUNTER (OUTPATIENT)
Facility: CLINIC | Age: 83
End: 2018-05-30
Payer: COMMERCIAL

## 2018-05-30 VITALS
HEIGHT: 64 IN | OXYGEN SATURATION: 92 % | HEART RATE: 69 BPM | BODY MASS INDEX: 25.1 KG/M2 | WEIGHT: 147 LBS | SYSTOLIC BLOOD PRESSURE: 118 MMHG | DIASTOLIC BLOOD PRESSURE: 81 MMHG

## 2018-05-30 DIAGNOSIS — I48.91 ATRIAL FIBRILLATION (H): ICD-10-CM

## 2018-05-30 DIAGNOSIS — I50.32 CHRONIC DIASTOLIC HEART FAILURE (H): ICD-10-CM

## 2018-05-30 DIAGNOSIS — N18.30 CKD (CHRONIC KIDNEY DISEASE) STAGE 3, GFR 30-59 ML/MIN (H): ICD-10-CM

## 2018-05-30 DIAGNOSIS — Z79.899 ON AMIODARONE THERAPY: ICD-10-CM

## 2018-05-30 DIAGNOSIS — I48.19 PERSISTENT ATRIAL FIBRILLATION (H): ICD-10-CM

## 2018-05-30 PROBLEM — Z92.89 HISTORY OF CARDIOVERSION: Status: ACTIVE | Noted: 2018-02-12

## 2018-05-30 PROBLEM — R13.10 DYSPHAGIA: Status: ACTIVE | Noted: 2018-05-30

## 2018-05-30 PROBLEM — M50.30 DDD (DEGENERATIVE DISC DISEASE), CERVICAL: Status: ACTIVE | Noted: 2018-05-30

## 2018-05-30 PROBLEM — E04.9 GOITER: Status: ACTIVE | Noted: 2018-05-30

## 2018-05-30 PROBLEM — E05.90 HYPERTHYROIDISM: Status: ACTIVE | Noted: 2018-05-30

## 2018-05-30 LAB
ALBUMIN SERPL-MCNC: 3.6 G/DL (ref 3.4–5)
ALP SERPL-CCNC: 95 U/L (ref 40–150)
ALT SERPL W P-5'-P-CCNC: 28 U/L (ref 0–50)
ANION GAP SERPL CALCULATED.3IONS-SCNC: 8 MMOL/L (ref 3–14)
AST SERPL W P-5'-P-CCNC: 27 U/L (ref 0–45)
BILIRUB SERPL-MCNC: 0.6 MG/DL (ref 0.2–1.3)
BUN SERPL-MCNC: 42 MG/DL (ref 7–30)
CALCIUM SERPL-MCNC: 8.8 MG/DL (ref 8.5–10.1)
CHLORIDE SERPL-SCNC: 98 MMOL/L (ref 94–109)
CO2 SERPL-SCNC: 30 MMOL/L (ref 20–32)
CREAT SERPL-MCNC: 1.5 MG/DL (ref 0.52–1.04)
GFR SERPL CREATININE-BSD FRML MDRD: 32 ML/MIN/1.7M2
GLUCOSE SERPL-MCNC: 100 MG/DL (ref 70–99)
INR PPP: 2.21 (ref 0.86–1.14)
MAGNESIUM SERPL-MCNC: 2.6 MG/DL (ref 1.6–2.3)
POTASSIUM SERPL-SCNC: 4.4 MMOL/L (ref 3.4–5.3)
PROT SERPL-MCNC: 7.4 G/DL (ref 6.8–8.8)
SODIUM SERPL-SCNC: 136 MMOL/L (ref 133–144)
TSH SERPL DL<=0.005 MIU/L-ACNC: 3.47 MU/L (ref 0.4–4)

## 2018-05-30 PROCEDURE — 83735 ASSAY OF MAGNESIUM: CPT | Performed by: INTERNAL MEDICINE

## 2018-05-30 PROCEDURE — 0296T ZIO PATCH HOLTER: CPT | Mod: ZF | Performed by: INTERNAL MEDICINE

## 2018-05-30 PROCEDURE — 93010 ELECTROCARDIOGRAM REPORT: CPT | Mod: ZP | Performed by: INTERNAL MEDICINE

## 2018-05-30 PROCEDURE — 99203 OFFICE O/P NEW LOW 30 MIN: CPT | Mod: ZP | Performed by: INTERNAL MEDICINE

## 2018-05-30 PROCEDURE — 0298T ZZC EXT ECG > 48HR TO 21 DAY REVIEW AND INTERPRETATN: CPT | Performed by: INTERNAL MEDICINE

## 2018-05-30 PROCEDURE — 93005 ELECTROCARDIOGRAM TRACING: CPT | Mod: ZF

## 2018-05-30 PROCEDURE — 36415 COLL VENOUS BLD VENIPUNCTURE: CPT | Performed by: INTERNAL MEDICINE

## 2018-05-30 PROCEDURE — G0463 HOSPITAL OUTPT CLINIC VISIT: HCPCS | Mod: 25,ZF

## 2018-05-30 PROCEDURE — 84443 ASSAY THYROID STIM HORMONE: CPT | Performed by: INTERNAL MEDICINE

## 2018-05-30 PROCEDURE — 85610 PROTHROMBIN TIME: CPT | Performed by: INTERNAL MEDICINE

## 2018-05-30 PROCEDURE — 80053 COMPREHEN METABOLIC PANEL: CPT | Performed by: INTERNAL MEDICINE

## 2018-05-30 RX ORDER — LOSARTAN POTASSIUM 50 MG/1
50 TABLET ORAL DAILY
COMMUNITY

## 2018-05-30 RX ORDER — POTASSIUM CHLORIDE 1500 MG/1
40 TABLET, EXTENDED RELEASE ORAL
Status: CANCELLED | OUTPATIENT
Start: 2018-05-30

## 2018-05-30 RX ORDER — METOPROLOL SUCCINATE 100 MG/1
100 TABLET, EXTENDED RELEASE ORAL 2 TIMES DAILY
COMMUNITY

## 2018-05-30 RX ORDER — WARFARIN SODIUM 5 MG/1
5 TABLET ORAL DAILY
COMMUNITY

## 2018-05-30 RX ORDER — TRIAMCINOLONE ACETONIDE 1 MG/G
1 OINTMENT TOPICAL PRN
COMMUNITY
Start: 2017-04-05

## 2018-05-30 RX ORDER — POTASSIUM CHLORIDE 1500 MG/1
20 TABLET, EXTENDED RELEASE ORAL
Status: CANCELLED | OUTPATIENT
Start: 2018-05-30

## 2018-05-30 RX ORDER — FUROSEMIDE 40 MG
80 TABLET ORAL 2 TIMES DAILY
COMMUNITY

## 2018-05-30 RX ORDER — MAGNESIUM SULFATE HEPTAHYDRATE 40 MG/ML
2 INJECTION, SOLUTION INTRAVENOUS
Status: CANCELLED | OUTPATIENT
Start: 2018-05-30

## 2018-05-30 RX ORDER — AMIODARONE HYDROCHLORIDE 200 MG/1
200 TABLET ORAL DAILY
COMMUNITY

## 2018-05-30 RX ORDER — DESONIDE 0.5 MG/G
1 OINTMENT TOPICAL PRN
COMMUNITY
Start: 2017-10-13

## 2018-05-30 ASSESSMENT — PAIN SCALES - GENERAL: PAINLEVEL: EXTREME PAIN (8)

## 2018-05-30 NOTE — MR AVS SNAPSHOT
After Visit Summary   5/30/2018    Bella Manley    MRN: [id number]           Patient Information     Date Of Birth          11/11/1925        Visit Information        Provider Department      5/30/2018 2:00 PM Jose Pruitt MD [facility]        Today's Diagnoses     Atrial fibrillation (H)        Chronic diastolic heart failure (H)        CKD (chronic kidney disease) stage 3, GFR 30-59 ml/min        On amiodarone therapy          Care Instructions    Wear ZIO patch for one week.  Schedule Cardioversion in 2 weeks, June 13th,   HOLD LASIX MORNING OF PROCEDURE  Schedule Pulmonary Function test, at same time as clinic visit.  Follow up in one month with Gail RAM.      Melisa Tracey, RN  Cardiology Care Coordinator  Please be aware that I work part-time but I will be checking messages several times per week.   For urgent needs, please call the number below.    [phone], press 1 for [place], press 3 to speak to a nurse    You are scheduled for a Cardioversion at the [facility] ([address], [facility] [address], [phone]).       Follow these instructions:    1. Report to the GOLD waiting room in the [facility] hospital on: June 13, 2018   At 7am  Please arrive at the time listed on this letter and disregard any phone reminder times.     2. DO NOT EAT OR DRINK ANYTHING FOR 8 HOURS PRIOR TO ARRIVAL.     3. The morning of your procedure you may take your scheduled medications with a SIP of water. If you take diabetic medications or a diuretic, you may hold these.     4. You will receive medication that makes you sleepy; you will need a  and someone to stay with you for 24 hours following this procedure.    You should not make any legal decisions for 24 hours following discharge.     Return 1 month after to see Gail Eldridge NP on  July 25 (Breathing test at 9:30 and Gail at 10:30am)        If your question concerns the schedule/appointment times, contact:  [place]  LATESHA Krishnamurthy Procedure   [phone]                Follow-ups after your visit        Your next 10 appointments already scheduled     Jun 13, 2018  7:15 AM CDT   LAB with UU LAB GOLD WAITING   [facility], Lab ([facility])    [address] [address]              Please do not eat 10-12 hours before your appointment if you are coming in fasting for labs on lipids, cholesterol, or glucose (sugar). This does not apply to pregnant women. Water, hot tea and black coffee (with nothing added) are okay. Do not drink other fluids, diet soda or chew gum.            Jun 13, 2018  7:30 AM CDT   Procedure 4 hour with U2A ROOM 17   [address] [facility] ([facility])    [address] [facility] [address]               Jun 13, 2018  8:30 AM CDT   Cardioversion no [employer] with [id number]   [facility],  Echocardiography ([facility])    [address] [facility] [address] [phone]           1) NPO for 8 hours prior to the procedure except for sips of water with medications. 2) Hold insulin or oral diabetic medications morning of procedure. May take   dose long acting insulin. Follow further instructions of PMD. 3) Continue daily Coumadin. ~ If taking Digoxin, hold AM of procedure. ~Plan to have a responsible adult take you home after the exam. You may not drive, take a bus or taxi by yourself. A responsible adult must stay with you for 24 hours after the test.            Jun 13, 2018   Procedure with GENERIC ANESTHESIA PROVIDER   [facility], Same Day Surgery (--)    [address] [facility] [address]   [phone]            Jul 25, 2018  9:30 AM CDT   [facility]  [facility]    [address]  3rd Floor  [facility] [id number] [phone]             Jul 25, 2018 10:30 AM CDT   (Arrive by 10:15 AM)   RETURN ARRHYTHMIA with FABIAN Rice CNP   [facility] ([facility])    [address]  [address] [place] [address] [phone]              Future tests that were ordered for you today     Open Future Orders        Priority Expected Expires Ordered    General PFT Lab (Please always keep checked) Routine  5/30/2019 5/30/2018    Pulmonary Function Test Routine 6/13/2018 5/30/2019 5/30/2018    Cardioversion Routine 6/13/2018 5/30/2019 5/30/2018    Cardioversion Routine 6/13/2018 5/30/2019 5/30/2018            Who to contact     If you have questions or need follow up information about today's clinic visit or your schedule please contact [facility] directly at [phone].  Normal or non-critical lab and imaging results will be communicated to you by MyChart, letter or phone within 4 business days after the clinic has received the results. If you do not hear from us within 7 days, please contact the clinic through [employer] or phone. If you have a critical or abnormal lab result, we will notify you by phone as soon as possible.  Submit refill requests through [employer] or call your pharmacy and they will forward the refill request to us. Please allow 3 business days for your refill to be completed.          Additional Information About Your Visit        MyChart Information     [employer] gives you secure access to your electronic health record. If you see a primary care provider, you can also send messages to your care team and make appointments. If you have questions, please call your primary care clinic.  If you do not have a primary care provider, please call [phone] and they will assist you.        Care EveryWhere ID     This is your Care EveryWhere ID. This could be used by other organizations to access your [place] medical records  [id number]        Your Vitals Were     Pulse Height  "Pulse Oximetry Breastfeeding? BMI (Body Mass Index)       69 1.626 m (5' 4\") 92% No 25.23 kg/m2        Blood Pressure from Last 3 Encounters:   05/30/18 118/81    Weight from Last 3 Encounters:   05/30/18 66.7 kg (147 lb)              We Performed the Following     EKG 12-lead, tracing only (Future)        Primary Care Provider Office Phone # Fax #    Hank Arvizu [phone]       [facility] [address]  [facility] [address]        Equal Access to Services     [facility]: Hadii aad ku hadasho Soomaali, waaxda luqadaha, qaybta kaalmada adeegyada, maryuri rubi hayaan aderekha agustin . So [facility] [phone].    ATENCIÓN: Si habla español, tiene a zimmerman disposición servicios gratuitos de asistencia lingüística. [facility] [phone].    We comply with applicable federal civil rights laws and [place] laws. We do not discriminate on the basis of race, color, national origin, age, disability, sex, sexual orientation, or gender identity.            Thank you!     Thank you for choosing [facility]  for your care. Our goal is always to provide you with excellent care. Hearing back from our patients is one way we can continue to improve our services. Please take a few minutes to complete the written survey that you may receive in the mail after your visit with us. Thank you!             Your Updated Medication List - Protect others around you: Learn how to safely use, store and throw away your medicines at www.disposemymeds.org.          This list is accurate as of 5/30/18  4:08 PM.  Always use your most recent med list.                   Brand Name Dispense Instructions for use Diagnosis    amiodarone 200 MG tablet    PACERONE/CODARONE     Take 200 mg by mouth daily    Atrial fibrillation (H), Chronic diastolic heart failure (H), CKD (chronic kidney disease) stage 3, GFR 30-59 ml/min, On amiodarone therapy       CALCIUM 1000 + D PO      Take 1 tablet by mouth daily     "    desonide 0.05 % ointment    DESOWEN     Apply 1 Application topically as needed        K-DUR PO      Take 80 mEq by mouth daily    Atrial fibrillation (H), Chronic diastolic heart failure (H), CKD (chronic kidney disease) stage 3, GFR 30-59 ml/min, On amiodarone therapy       LASIX 40 MG tablet   Generic drug:  furosemide      Take 80 mg by mouth 2 times daily At breakfast and lunch    Atrial fibrillation (H), Chronic diastolic heart failure (H), CKD (chronic kidney disease) stage 3, GFR 30-59 ml/min, On amiodarone therapy       losartan 50 MG tablet    COZAAR     Take 50 mg by mouth daily    Atrial fibrillation (H), Chronic diastolic heart failure (H), CKD (chronic kidney disease) stage 3, GFR 30-59 ml/min, On amiodarone therapy       TGT PSYLLIUM FIBER 0.52 g capsule   Generic drug:  psyllium      Take 2 capsules by mouth as needed        TOPROL  MG 24 hr tablet   Generic drug:  metoprolol succinate      Take 100 mg by mouth 2 times daily    Atrial fibrillation (H), Chronic diastolic heart failure (H), CKD (chronic kidney disease) stage 3, GFR 30-59 ml/min, On amiodarone therapy       triamcinolone 0.1 % ointment    KENALOG     Apply 1 Application topically as needed        warfarin 5 MG tablet    COUMADIN     Take 5 mg by mouth daily 2.5mg -5 mg per INR clinic    Atrial fibrillation (H), Chronic diastolic heart failure (H), CKD (chronic kidney disease) stage 3, GFR 30-59 ml/min, On amiodarone therapy

## 2018-05-30 NOTE — PATIENT INSTRUCTIONS
Wear ZIO patch for one week.  Schedule Cardioversion in 2 weeks, June 13th,   HOLD LASIX MORNING OF PROCEDURE  Schedule Pulmonary Function test, at same time as clinic visit.  Follow up in one month with Gail RAM.      Melisa Tracey, RN  Cardiology Care Coordinator  Please be aware that I work part-time but I will be checking messages several times per week.   For urgent needs, please call the number below.    [phone], press 1 for [place], press 3 to speak to a nurse    You are scheduled for a Cardioversion at the [facility] ([address], [facility] [address], [phone]).       Follow these instructions:    1. Report to the GOLD waiting room in the [facility] hospital on: June 13, 2018   At 7am  Please arrive at the time listed on this letter and disregard any phone reminder times.     2. DO NOT EAT OR DRINK ANYTHING FOR 8 HOURS PRIOR TO ARRIVAL.     3. The morning of your procedure you may take your scheduled medications with a SIP of water. If you take diabetic medications or a diuretic, you may hold these.     4. You will receive medication that makes you sleepy; you will need a  and someone to stay with you for 24 hours following this procedure.    You should not make any legal decisions for 24 hours following discharge.     Return 1 month after to see Gail Eldridge NP on  July 25 (Breathing test at 9:30 and Gail at 10:30am)        If your question concerns the schedule/appointment times, contact:  LATESHA Colmenares Procedure   [phone]

## 2018-05-30 NOTE — NURSING NOTE
Per Jose Renee patient to have 7 day zio monitor placed.  Diagnosis: AFIB  Monitor placed: Yes  Patient Instructed: Yes  Patient verbalized understanding: Yes  Holter # [id number]    Placed by ANNALISA Dia

## 2018-05-30 NOTE — PROGRESS NOTES
Electrophysiology Clinic Note  HPI:   Ms. Manley is a 89 year old female who has a past medical history significant for dysphonia, Goiter/hyperthyroidism, HFpEF, mod pulmonary HTN, Meniere's disease, CKD III, left carotid bruit, and PAF (CHADVASC 3 on warfarin) s/p DCCVs X3 most recent 2/2018. She was referred for AF management.     She has had a longstanding history of PAF which was first diagnosed back in 2006. She reports she has had 3 DCCVs (2006, 10/2015, and 2/2018). She has been on amiodarone since 2015 She reports  symptoms when in AF of palpitations, BA, and fatigue. She states she clearly feels better in sinus. She more recently had recurrent PAF and had DCCV on 2/2018. In 4/2018, she recurred back into AF. She followed up with her Cardiology team at [facility] who then referred her here for AF management.     She reports that she felt much better after her DCCV and could tell immediately when she went back into AF in 4/2018. Despite adequate rate control, she continues to have palpitations and fatigue. No other cardiac history. A recent echo showed LVEF 60-65%, moderate to severe tricuspid regurgitation. Labs today for electrolytes, She denies any chest pain/pressures, dizziness, lightheadedness, PND, orthopnea, palpitations, or syncopal symptoms. Presenting 12 lead ECG shows AF Vent Rate 76 bpm, QRS 94 ms, QTc 492 ms. Current cardiac medications include: Amiodarone, Lasix, Cozaar, Toprol XL, and Warfarin.          PAST MEDICAL HISTORY:  Past Medical History:   Diagnosis Date     Chronic diastolic heart failure (H) 5/20/2016     CKD (chronic kidney disease) stage 3, GFR 30-59 ml/min 5/30/2018     DDD (degenerative disc disease), cervical 5/30/2018     Dysphagia/ with cold liquids only 5/30/2018     Goiter 5/30/2018     History of cardioversion 2/12/2018     Hyperthyroidism 5/30/2018     Left carotid bruit 5/1/2012     Pulmonary hypertension/ mild  PAP 40-45mmHg 5/1/2012       CURRENT  "MEDICATIONS:  Current Outpatient Prescriptions   Medication Sig Dispense Refill     amiodarone (PACERONE/CODARONE) 200 MG tablet Take 200 mg by mouth daily       Calcium Carb-Cholecalciferol (CALCIUM 1000 + D PO) Take 1 tablet by mouth daily       desonide (DESOWEN) 0.05 % ointment Apply 1 Application topically as needed       furosemide (LASIX) 40 MG tablet Take 80 mg by mouth 2 times daily At breakfast and lunch        losartan (COZAAR) 50 MG tablet Take 50 mg by mouth daily       metoprolol succinate (TOPROL XL) 100 MG 24 hr tablet Take 100 mg by mouth 2 times daily       Potassium Chloride Pamela ER (K-DUR PO) Take 80 mEq by mouth daily        triamcinolone (KENALOG) 0.1 % ointment Apply 1 Application topically as needed       warfarin (COUMADIN) 5 MG tablet Take 5 mg by mouth daily 2.5mg -5 mg per INR clinic       psyllium (TGT PSYLLIUM FIBER) 0.52 g capsule Take 2 capsules by mouth as needed         PAST SURGICAL HISTORY:  No past surgical history on file.    ALLERGIES:     Allergies   Allergen Reactions     Acetaminophen-Codeine Dermatitis     Acetaminophen-Codeine Nausea and Vomiting     Alendronic Acid      Difficulty swallowing     Erythromycin Dermatitis     Hydrochlorothiazide Itching and Nausea     Weakness     Hydrocodone Nausea and Vomiting     Hydromorphone Nausea and Vomiting     Isoniazid Nausea and Vomiting     Nickel Dermatitis     Oxycodone Nausea and Vomiting     Propoxyphene N-Apap Nausea and Vomiting     Risedronate      Difficulty swallowing     Tramadol Nausea and Vomiting       FAMILY HISTORY:  No family history on file.    SOCIAL HISTORY:  Social History   Substance Use Topics     Smoking status: Former Smoker     Quit date: 5/30/1981     Smokeless tobacco: Never Used     Alcohol use Not on file       ROS:   A comprehensive 10 point review of systems negative other than as mentioned in HPI.  Exam:  /81  Pulse 69  Ht 1.626 m (5' 4\")  Wt 66.7 kg (147 lb)  SpO2 92%  Breastfeeding? " No  BMI 25.23 kg/m2  GENERAL APPEARANCE: alert and no distress  HEENT: no icterus, no xanthelasmas, normal pupil size and reaction, normal palate, mucosa moist, no central cyanosis  NECK: supple, JVP not elevated  RESPIRATORY: good airflow, lungs clear, no retractions, respirations are unlabored, normal respiratory rate  CARDIOVASCULAR: Irregularly irregular,  no murmur, click or rub, precordium quiet with normal PMI.  ABDOMEN: soft, non tender, bowel sounds normal, non-distended  EXTREMITIES: peripheral pulses normal, no edema  NEURO: alert and oriented to person/place/time, normal speech, gait and affect  SKIN: no ecchymoses, no rashes  PSYCH: normal affect, cooperative    Labs:  Reviewed.     Testing/Procedures:    5/22/18 ECHOCARDIOGRAM (OSH REPORT):   Interpretation Summary    * The left ventricle is normal size. The left ventricular systolic function  is normal, estimated LVEF 60-65%.    * Left ventricular wall motion is grossly normal however, endocardial  definition is limited.    * The right ventricular cavity size is mildly enlarged.    * Low normal right ventricular systolic function.    * There is moderate to severe tricuspid regurgitation.    * There is no pericardial effusion.    * Compared to prior study on 5/6/2016 the LVEF remains in normal range. The  TR is best seen in the subcostal views, is atleast moderate today.    * No pulmonary hypertension, estimated right ventricular systolic pressure  is  26 mmHg.      Assessment and Plan:   Ms. Manley is a 89 year old female who has a past medical history significant for dysphonia, Goiter/hyperthyroidism, HFpEF, mod pulmonary HTN, Meniere's disease, CKD III, left carotid bruit, and PAF (CHADVASC 3 on warfarin) s/p DCCVs X3 most recent 2/2018. She was referred for AF management.   She has had a longstanding history of PAF which was first diagnosed back in 2006. She reports she has had 3 DCCVs (2006, 10/2015, and 2/2018). She has been on amiodarone since   She reports  symptoms when in AF of palpitations, BA, and fatigue. She states she clearly feels better in sinus. Despite adequate rate control, she continues to have palpitations and fatigue.  A recent echo showed LVEF 60-65%, moderate to severe tricuspid regurgitation.     We discussed in detail with the patient management/treatment options for Fiona includin. Stroke Prophylaxis:  CHADSVASC=++age, +gender  3, corresponding to a 3.2% annual stroke / systemic emolism event rate. indicating need for long term oral anticoagulation.She is appropriately on Warfarin. No bleeding issues.     2. Rate Control: Appears to be well rate controlled. Continue Toprol XL. Will get updated 1 week zio patch to assess HR control   3. Rhythm Control: Cardioversion, Antiarrhythmics and/or ablation are options for rhythm control. She has been on amiodarone for several yats and now has had recurrent AF with last DCCV on 2018. She is now back in AF again wand is symptomatic. Limited alternative AAT options given  CKD. We discussed indication, risk, and benefits of ablation for AF. Her stroke risk would be higher given her age alone. We also discussed efficacy of ablation. We also discussed doing a soft reload of amiodarone and re attempting DCCV. If she continues to have recurrent AF with poorly controlled HRs, we could consider AVN ablation and pacemaker. After a detailed discussion, she would like to reattempt DCCV. While on amiodarone, she will require annual PFTs, and LFTs/TFTs every 6. She is due for updated PFTs.   4. Risk Factor Management: maintain tight BP control, and MARITZA evaluation as indicated.      Follow up 4-6 weeks after DCCV.     The patient states understanding and is agreeable with plan.   This patient was seen and evaluated with Dr. Pruitt. The above note reflects our joint assessment and plan.   Gail Eldridge, FABIAN CNP  Pager: [phone]    EP STAFF NOTE  I have seen and examined the patient as part of a shared  visit with LATESHA Nair NP.  I agree with the note above. I reviewed today's vital signs and medications. I have reviewed and discussed with the advanced practice provider their physical examination, assessment, and plan   Briefly, 89 yo patient with symptomatic AF that is rate controled otherwise, but patient feels better in sinus rhythm, on amiodarone  My key history/exam findings are: AF rate controled  The key management decisions made by me: Patient had symptomatic persistent AF with indication for rhythm control if possible. However, the only viable option at this stage is ablation. Other AATs other than amiodarone are not viable options given her age, her kidney function and the relatively lower efficacy compared to amiodarone. Ablation therapy is associated with increased risk of stroke at her age, and increased risk of other complications including perforation. I discussed the potential complications with the patient, along with the efficacy that is not perfect as the procedure is not considered a ure for AF. My personal recommendation is leaning towards NOT pursuing the ablation as the risks might make her quality of life worse. The patient was also concerned abot the risk of AF, and we agreed not to pursue ablation. We will repeat another DCCV with another soft reload of amiodaonre, and if recurrences, consider rate control approach. A PPM and AVN ablation at this stage is not warranted as she is rate controled.    Jose Pruitt MD [facility]  Cardiology - Electrophysiology    CC  MAGALY WANG

## 2018-05-30 NOTE — NURSING NOTE
Chief Complaint   Patient presents with     New Patient     New atrial fibrillation      Vitals were performed, medications were reconciled. EKG was performed.   Ana Jacobo MA

## 2018-05-30 NOTE — LETTER
5/30/2018      RE: Bella Manley  [address]  [place] [address]       Dear Colleague,    Thank you for the opportunity to participate in the care of your patient, Bella Manley, at the [facility] at [facility]. Please see a copy of my visit note below.    Electrophysiology Clinic Note  HPI:   Ms. Manley is a 89 year old female who has a past medical history significant for dysphonia, Goiter/hyperthyroidism, HFpEF, mod pulmonary HTN, Meniere's disease, CKD III, left carotid bruit, and PAF (CHADVASC 3 on warfarin) s/p DCCVs X3 most recent 2/2018. She was referred for AF management.     She has had a longstanding history of PAF which was first diagnosed back in 2006. She reports she has had 3 DCCVs (2006, 10/2015, and 2/2018). She has been on amiodarone since 2015 She reports  symptoms when in AF of palpitations, BA, and fatigue. She states she clearly feels better in sinus. She more recently had recurrent PAF and had DCCV on 2/2018. In 4/2018, she recurred back into AF. She followed up with her Cardiology team at [facility] who then referred her here for AF management.     She reports that she felt much better after her DCCV and could tell immediately when she went back into AF in 4/2018. Despite adequate rate control, she continues to have palpitations and fatigue. No other cardiac history. A recent echo showed LVEF 60-65%, moderate to severe tricuspid regurgitation. Labs today for electrolytes, She denies any chest pain/pressures, dizziness, lightheadedness, PND, orthopnea, palpitations, or syncopal symptoms. Presenting 12 lead ECG shows AF Vent Rate 76 bpm, QRS 94 ms, QTc 492 ms. Current cardiac medications include: Amiodarone, Lasix, Cozaar, Toprol XL, and Warfarin.          PAST MEDICAL HISTORY:  Past Medical History:   Diagnosis Date     Chronic diastolic heart failure (H) 5/20/2016     CKD (chronic kidney disease) stage 3, GFR 30-59  ml/min 5/30/2018     DDD (degenerative disc disease), cervical 5/30/2018     Dysphagia/ with cold liquids only 5/30/2018     Goiter 5/30/2018     History of cardioversion 2/12/2018     Hyperthyroidism 5/30/2018     Left carotid bruit 5/1/2012     Pulmonary hypertension/ mild  PAP 40-45mmHg 5/1/2012       CURRENT MEDICATIONS:  Current Outpatient Prescriptions   Medication Sig Dispense Refill     amiodarone (PACERONE/CODARONE) 200 MG tablet Take 200 mg by mouth daily       Calcium Carb-Cholecalciferol (CALCIUM 1000 + D PO) Take 1 tablet by mouth daily       desonide (DESOWEN) 0.05 % ointment Apply 1 Application topically as needed       furosemide (LASIX) 40 MG tablet Take 80 mg by mouth 2 times daily At breakfast and lunch        losartan (COZAAR) 50 MG tablet Take 50 mg by mouth daily       metoprolol succinate (TOPROL XL) 100 MG 24 hr tablet Take 100 mg by mouth 2 times daily       Potassium Chloride Pamela ER (K-DUR PO) Take 80 mEq by mouth daily        triamcinolone (KENALOG) 0.1 % ointment Apply 1 Application topically as needed       warfarin (COUMADIN) 5 MG tablet Take 5 mg by mouth daily 2.5mg -5 mg per INR clinic       psyllium (TGT PSYLLIUM FIBER) 0.52 g capsule Take 2 capsules by mouth as needed         PAST SURGICAL HISTORY:  No past surgical history on file.    ALLERGIES:     Allergies   Allergen Reactions     Acetaminophen-Codeine Dermatitis     Acetaminophen-Codeine Nausea and Vomiting     Alendronic Acid      Difficulty swallowing     Erythromycin Dermatitis     Hydrochlorothiazide Itching and Nausea     Weakness     Hydrocodone Nausea and Vomiting     Hydromorphone Nausea and Vomiting     Isoniazid Nausea and Vomiting     Nickel Dermatitis     Oxycodone Nausea and Vomiting     Propoxyphene N-Apap Nausea and Vomiting     Risedronate      Difficulty swallowing     Tramadol Nausea and Vomiting       FAMILY HISTORY:  No family history on file.    SOCIAL HISTORY:  Social History   Substance Use Topics      "Smoking status: Former Smoker     Quit date: 5/30/1981     Smokeless tobacco: Never Used     Alcohol use Not on file       ROS:   A comprehensive 10 point review of systems negative other than as mentioned in HPI.  Exam:  /81  Pulse 69  Ht 1.626 m (5' 4\")  Wt 66.7 kg (147 lb)  SpO2 92%  Breastfeeding? No  BMI 25.23 kg/m2  GENERAL APPEARANCE: alert and no distress  HEENT: no icterus, no xanthelasmas, normal pupil size and reaction, normal palate, mucosa moist, no central cyanosis  NECK: supple, JVP not elevated  RESPIRATORY: good airflow, lungs clear, no retractions, respirations are unlabored, normal respiratory rate  CARDIOVASCULAR: Irregularly irregular,  no murmur, click or rub, precordium quiet with normal PMI.  ABDOMEN: soft, non tender, bowel sounds normal, non-distended  EXTREMITIES: peripheral pulses normal, no edema  NEURO: alert and oriented to person/place/time, normal speech, gait and affect  SKIN: no ecchymoses, no rashes  PSYCH: normal affect, cooperative    Labs:  Reviewed.     Testing/Procedures:    5/22/18 ECHOCARDIOGRAM (OSH REPORT):   Interpretation Summary    * The left ventricle is normal size. The left ventricular systolic function  is normal, estimated LVEF 60-65%.    * Left ventricular wall motion is grossly normal however, endocardial  definition is limited.    * The right ventricular cavity size is mildly enlarged.    * Low normal right ventricular systolic function.    * There is moderate to severe tricuspid regurgitation.    * There is no pericardial effusion.    * Compared to prior study on 5/6/2016 the LVEF remains in normal range. The  TR is best seen in the subcostal views, is atleast moderate today.    * No pulmonary hypertension, estimated right ventricular systolic pressure  is  26 mmHg.      Assessment and Plan:   Ms. Manley is a 89 year old female who has a past medical history significant for dysphonia, Goiter/hyperthyroidism, HFpEF, mod pulmonary HTN, Meniere's " disease, CKD III, left carotid bruit, and PAF (CHADVASC 3 on warfarin) s/p DCCVs X3 most recent 2018. She was referred for AF management.   She has had a longstanding history of PAF which was first diagnosed back in . She reports she has had 3 DCCVs (, 10/2015, and 2018). She has been on amiodarone since  She reports  symptoms when in AF of palpitations, BA, and fatigue. She states she clearly feels better in sinus. Despite adequate rate control, she continues to have palpitations and fatigue.  A recent echo showed LVEF 60-65%, moderate to severe tricuspid regurgitation.     We discussed in detail with the patient management/treatment options for Fiona includin. Stroke Prophylaxis:  CHADSVASC=++age, +gender  3, corresponding to a 3.2% annual stroke / systemic emolism event rate. indicating need for long term oral anticoagulation.She is appropriately on Warfarin. No bleeding issues.     2. Rate Control: Appears to be well rate controlled. Continue Toprol XL. Will get updated 1 week zio patch to assess HR control   3. Rhythm Control: Cardioversion, Antiarrhythmics and/or ablation are options for rhythm control. She has been on amiodarone for several yats and now has had recurrent AF with last DCCV on 2018. She is now back in AF again wand is symptomatic. Limited alternative AAT options given  CKD. We discussed indication, risk, and benefits of ablation for AF. Her stroke risk would be higher given her age alone. We also discussed efficacy of ablation. We also discussed doing a soft reload of amiodarone and re attempting DCCV. If she continues to have recurrent AF with poorly controlled HRs, we could consider AVN ablation and pacemaker. After a detailed discussion, she would like to reattempt DCCV. While on amiodarone, she will require annual PFTs, and LFTs/TFTs every 6. She is due for updated PFTs.   4. Risk Factor Management: maintain tight BP control, and MARITZA evaluation as indicated.       Follow up 4-6 weeks after DCCV.     The patient states understanding and is agreeable with plan.   This patient was seen and evaluated with Dr. Pruitt. The above note reflects our joint assessment and plan.   FABIAN Nair CNP  Pager: [phone]    EP STAFF NOTE  I have seen and examined the patient as part of a shared visit with LATESHA Nair NP.  I agree with the note above. I reviewed today's vital signs and medications. I have reviewed and discussed with the advanced practice provider their physical examination, assessment, and plan   Briefly, 89 yo patient with symptomatic AF that is rate controled otherwise, but patient feels better in sinus rhythm, on amiodarone  My key history/exam findings are: AF rate controled  The key management decisions made by me: Patient had symptomatic persistent AF with indication for rhythm control if possible. However, the only viable option at this stage is ablation. Other AATs other than amiodarone are not viable options given her age, her kidney function and the relatively lower efficacy compared to amiodarone. Ablation therapy is associated with increased risk of stroke at her age, and increased risk of other complications including perforation. I discussed the potential complications with the patient, along with the efficacy that is not perfect as the procedure is not considered a ure for AF. My personal recommendation is leaning towards NOT pursuing the ablation as the risks might make her quality of life worse. The patient was also concerned abot the risk of AF, and we agreed not to pursue ablation. We will repeat another DCCV with another soft reload of amiodaonre, and if recurrences, consider rate control approach. A PPM and AVN ablation at this stage is not warranted as she is rate controled.    Jose Pruitt MD [facility]  Cardiology - Electrophysiology    CC  MAGALY WANG

## 2018-05-31 LAB — INTERPRETATION ECG - MUSE: NORMAL

## 2018-06-06 ENCOUNTER — TRANSFERRED RECORDS (OUTPATIENT)
Dept: HEALTH INFORMATION MANAGEMENT | Facility: CLINIC | Age: 83
End: 2018-06-06

## 2018-06-11 ENCOUNTER — TELEPHONE (OUTPATIENT)
Dept: CARDIOLOGY | Facility: CLINIC | Age: 83
End: 2018-06-11

## 2018-06-11 NOTE — TELEPHONE ENCOUNTER
Per this patient's daughter, she had EKG last Wed. At [facility] at [facility] in [place]. She apparently was in sinus rhythm at that time. Pt is feeling well and does not think she is in AF now. She Is scheduled for cardioversion on Wed. 6/11.   We will try to find EKG from that visit.  Daughter has been trying to reach us about not coming. Message has been sent to several people  She has a fall last week and that is why she was seen

## 2018-11-16 ENCOUNTER — RECORDS - HEALTHEAST (OUTPATIENT)
Dept: LAB | Facility: CLINIC | Age: 83
End: 2018-11-16

## 2018-11-16 LAB
ANION GAP SERPL CALCULATED.3IONS-SCNC: 10 MMOL/L (ref 5–18)
BUN SERPL-MCNC: 26 MG/DL (ref 8–28)
CALCIUM SERPL-MCNC: 9.2 MG/DL (ref 8.5–10.5)
CHLORIDE BLD-SCNC: 97 MMOL/L (ref 98–107)
CO2 SERPL-SCNC: 32 MMOL/L (ref 22–31)
CREAT SERPL-MCNC: 1.45 MG/DL (ref 0.6–1.1)
GFR SERPL CREATININE-BSD FRML MDRD: 34 ML/MIN/1.73M2
GLUCOSE BLD-MCNC: 101 MG/DL (ref 70–125)
POTASSIUM BLD-SCNC: 3.6 MMOL/L (ref 3.5–5)
SODIUM SERPL-SCNC: 139 MMOL/L (ref 136–145)

## 2018-11-26 ENCOUNTER — RECORDS - HEALTHEAST (OUTPATIENT)
Dept: LAB | Facility: CLINIC | Age: 83
End: 2018-11-26

## 2018-11-26 LAB
ANION GAP SERPL CALCULATED.3IONS-SCNC: 13 MMOL/L (ref 5–18)
BUN SERPL-MCNC: 25 MG/DL (ref 8–28)
CALCIUM SERPL-MCNC: 9.7 MG/DL (ref 8.5–10.5)
CHLORIDE BLD-SCNC: 98 MMOL/L (ref 98–107)
CO2 SERPL-SCNC: 31 MMOL/L (ref 22–31)
CREAT SERPL-MCNC: 1.33 MG/DL (ref 0.6–1.1)
GFR SERPL CREATININE-BSD FRML MDRD: 37 ML/MIN/1.73M2
GLUCOSE BLD-MCNC: 123 MG/DL (ref 70–125)
POTASSIUM BLD-SCNC: 4.4 MMOL/L (ref 3.5–5)
SODIUM SERPL-SCNC: 142 MMOL/L (ref 136–145)

## 2018-11-28 ENCOUNTER — RECORDS - HEALTHEAST (OUTPATIENT)
Dept: LAB | Facility: CLINIC | Age: 83
End: 2018-11-28

## 2018-11-28 LAB
ANION GAP SERPL CALCULATED.3IONS-SCNC: 10 MMOL/L (ref 5–18)
BUN SERPL-MCNC: 23 MG/DL (ref 8–28)
CALCIUM SERPL-MCNC: 9.1 MG/DL (ref 8.5–10.5)
CHLORIDE BLD-SCNC: 98 MMOL/L (ref 98–107)
CO2 SERPL-SCNC: 31 MMOL/L (ref 22–31)
CREAT SERPL-MCNC: 1.26 MG/DL (ref 0.6–1.1)
GFR SERPL CREATININE-BSD FRML MDRD: 40 ML/MIN/1.73M2
GLUCOSE BLD-MCNC: 133 MG/DL (ref 70–125)
POTASSIUM BLD-SCNC: 3.3 MMOL/L (ref 3.5–5)
SODIUM SERPL-SCNC: 139 MMOL/L (ref 136–145)

## 2018-12-04 ENCOUNTER — RECORDS - HEALTHEAST (OUTPATIENT)
Dept: LAB | Facility: CLINIC | Age: 83
End: 2018-12-04

## 2018-12-04 LAB
ANION GAP SERPL CALCULATED.3IONS-SCNC: 13 MMOL/L (ref 5–18)
BUN SERPL-MCNC: 20 MG/DL (ref 8–28)
CALCIUM SERPL-MCNC: 9.1 MG/DL (ref 8.5–10.5)
CHLORIDE BLD-SCNC: 95 MMOL/L (ref 98–107)
CO2 SERPL-SCNC: 28 MMOL/L (ref 22–31)
CREAT SERPL-MCNC: 1.32 MG/DL (ref 0.6–1.1)
GFR SERPL CREATININE-BSD FRML MDRD: 38 ML/MIN/1.73M2
GLUCOSE BLD-MCNC: 107 MG/DL (ref 70–125)
POTASSIUM BLD-SCNC: 4.1 MMOL/L (ref 3.5–5)
SODIUM SERPL-SCNC: 136 MMOL/L (ref 136–145)

## 2018-12-11 ENCOUNTER — TRANSFERRED RECORDS (OUTPATIENT)
Dept: HEALTH INFORMATION MANAGEMENT | Facility: CLINIC | Age: 83
End: 2018-12-11

## 2018-12-11 ENCOUNTER — RECORDS - HEALTHEAST (OUTPATIENT)
Dept: LAB | Facility: CLINIC | Age: 83
End: 2018-12-11

## 2018-12-11 LAB
ANION GAP SERPL CALCULATED.3IONS-SCNC: 11 MMOL/L (ref 5–18)
BUN SERPL-MCNC: 20 MG/DL (ref 8–28)
CALCIUM SERPL-MCNC: 9.4 MG/DL (ref 8.5–10.5)
CHLORIDE BLD-SCNC: 97 MMOL/L (ref 98–107)
CO2 SERPL-SCNC: 31 MMOL/L (ref 22–31)
CREAT SERPL-MCNC: 1.22 MG/DL (ref 0.6–1.1)
CREAT SERPL-MCNC: 1.22 MG/DL (ref 0.6–1.1)
GFR SERPL CREATININE-BSD FRML MDRD: 41 ML/MIN/1.73M2
GFR SERPL CREATININE-BSD FRML MDRD: 41 ML/MIN/1.73M2
GLUCOSE BLD-MCNC: 73 MG/DL (ref 70–125)
GLUCOSE SERPL-MCNC: 73 MG/DL (ref 70–125)
POTASSIUM BLD-SCNC: 4.3 MMOL/L (ref 3.5–5)
POTASSIUM SERPL-SCNC: 4.3 MMOL/L (ref 3.5–5)
SODIUM SERPL-SCNC: 139 MMOL/L (ref 136–145)

## 2019-03-03 ENCOUNTER — RECORDS - HEALTHEAST (OUTPATIENT)
Dept: LAB | Facility: CLINIC | Age: 84
End: 2019-03-03

## 2019-03-03 LAB
ALBUMIN UR-MCNC: NEGATIVE MG/DL
APPEARANCE UR: CLEAR
BILIRUB UR QL STRIP: NEGATIVE
COLOR UR AUTO: NORMAL
GLUCOSE UR STRIP-MCNC: NEGATIVE MG/DL
HGB UR QL STRIP: NEGATIVE
KETONES UR STRIP-MCNC: NEGATIVE MG/DL
LEUKOCYTE ESTERASE UR QL STRIP: NEGATIVE
NITRATE UR QL: NEGATIVE
PH UR STRIP: 7.5 [PH] (ref 4.5–8)
SP GR UR STRIP: 1.01 (ref 1–1.03)
UROBILINOGEN UR STRIP-ACNC: NORMAL

## 2019-03-04 LAB — BACTERIA SPEC CULT: NORMAL

## 2019-09-28 ENCOUNTER — HEALTH MAINTENANCE LETTER (OUTPATIENT)
Age: 84
End: 2019-09-28